# Patient Record
Sex: MALE | Race: WHITE
[De-identification: names, ages, dates, MRNs, and addresses within clinical notes are randomized per-mention and may not be internally consistent; named-entity substitution may affect disease eponyms.]

---

## 2018-03-23 ENCOUNTER — HOSPITAL ENCOUNTER (OUTPATIENT)
Dept: HOSPITAL 80 - FED | Age: 68
Setting detail: OBSERVATION
LOS: 1 days | Discharge: HOME | End: 2018-03-24
Attending: INTERNAL MEDICINE | Admitting: INTERNAL MEDICINE
Payer: COMMERCIAL

## 2018-03-23 DIAGNOSIS — R55: ICD-10-CM

## 2018-03-23 DIAGNOSIS — K92.2: Primary | ICD-10-CM

## 2018-03-23 DIAGNOSIS — K25.9: ICD-10-CM

## 2018-03-23 DIAGNOSIS — E86.1: ICD-10-CM

## 2018-03-23 DIAGNOSIS — D62: ICD-10-CM

## 2018-03-23 DIAGNOSIS — I95.9: ICD-10-CM

## 2018-03-23 DIAGNOSIS — G20: ICD-10-CM

## 2018-03-23 DIAGNOSIS — G89.29: ICD-10-CM

## 2018-03-23 DIAGNOSIS — Z86.711: ICD-10-CM

## 2018-03-23 DIAGNOSIS — K92.1: ICD-10-CM

## 2018-03-23 DIAGNOSIS — I10: ICD-10-CM

## 2018-03-23 DIAGNOSIS — Z79.1: ICD-10-CM

## 2018-03-23 DIAGNOSIS — E78.5: ICD-10-CM

## 2018-03-23 LAB
INR PPP: 1.1 (ref 0.83–1.16)
PLATELET # BLD: 188 10^3/UL (ref 150–400)
PROTHROMBIN TIME: 14.4 SEC (ref 12–15)

## 2018-03-23 PROCEDURE — 96374 THER/PROPH/DIAG INJ IV PUSH: CPT

## 2018-03-23 PROCEDURE — G0378 HOSPITAL OBSERVATION PER HR: HCPCS

## 2018-03-23 PROCEDURE — 0DB68ZX EXCISION OF STOMACH, VIA NATURAL OR ARTIFICIAL OPENING ENDOSCOPIC, DIAGNOSTIC: ICD-10-PCS | Performed by: INTERNAL MEDICINE

## 2018-03-23 PROCEDURE — 99291 CRITICAL CARE FIRST HOUR: CPT

## 2018-03-23 PROCEDURE — 43239 EGD BIOPSY SINGLE/MULTIPLE: CPT

## 2018-03-23 PROCEDURE — 93005 ELECTROCARDIOGRAM TRACING: CPT

## 2018-03-23 PROCEDURE — 97166 OT EVAL MOD COMPLEX 45 MIN: CPT

## 2018-03-23 RX ADMIN — PANTOPRAZOLE SODIUM SCH MG: 40 INJECTION, POWDER, FOR SOLUTION INTRAVENOUS at 22:14

## 2018-03-23 NOTE — GHP
[f rep st]



                                                            HISTORY AND PHYSICAL





DATE OF ADMISSION:  03/23/2018



CHIEF COMPLAINT:  Syncope and melena.



HISTORY OF PRESENT ILLNESS:  This is a 68-year-old male with a history of Parkinson's who presents wi
th complaints of melena, which began the day prior to presentation, as well as a syncopal episode aft
er using the restroom 2 evenings prior to presentation.  Patient reports being in his normal state of
 health prior to 48 hours before presentation to the emergency department when he described using the
 restroom in the evening and after finishing using the restroom losing consciousness entirely.  The p
atient fell.  Denies hitting his head.  Has some bruising on the palm of his hand, but otherwise no i
njuries.  The patient came to, was not postictal and then did not have a recurrent episode.  Did noti
ce some overall weakness and lethargy in the next day.  He then experienced 3 episodes of dark black 
tarry stools and was feeling lightheaded, checked his blood pressure at the time and noted his systol
ics to be in the 70s.  Therefore, decided to present to the emergency department for evaluation.  In 
the evening, patient reports that his dizziness is improving status post receiving IV fluids. He alyson
es any palpitations.  Denies chest pain, denies shortness of breath.  Denies any melena stools today.
  Says his last dark stool was yesterday.  Denies diarrhea.  Denies abdominal pain.  Denies nausea or
 vomiting.  He did have 1 episode of burning reflux type pain in the week preceding his presentation.
  He reports that is not a normal symptom for him.  He experienced after taking many of his over-the-
counter supplements.  The patient denies any previous episodes of melena or GI bleeding.  Denies any 
dysuria.  Denies hematuria.  Denies rashes.  Reports that his son has a viral illness that he was exp
osed to in the last week.  He had subjective fevers and chills, which has since resolved.



PAST MEDICAL HISTORY:  

1.  Parkinson's, on medication.

2.  Hyperlipidemia.

3.  Hypertension.

4.  Chronic back pain with recent back surgery.

5.  History of pulmonary embolism bilateral in 2013, status post completion of his anticoagulant huong
tment.  

6.  History is negative for tobacco, very rare alcohol.  No illicit drugs or marijuana.



FAMILY HISTORY:  Negative for Parkinson's.



ADVANCED DIRECTIVES:  Patient is full COR, full tube.  His wife would be his medical decision maker.



REVIEW OF SYSTEMS:  A 10-point review of systems is negative with the exception of reported in the HP
I.



PHYSICAL EXAMINATION:  VITAL SIGNS: Systolic blood pressure 90/62, heart rate in the 80s, respiratory
 rate 12, saturating 95% on room air.  Afebrile. GENERAL: This is a tall, thin appearing male in no a
cute distress.  HEENT:  Notable for dry mucous membranes.  Eye exam is negative for any icterus. CARD
IOVASCULAR:  Patient is regular rate and rhythm. PULMONARY:  Good respiratory effort.  Clear to auscu
ltation bilaterally.  GASTROINTESTINAL:  Positive bowel sounds.  Abdomen is thin and soft.  He is ten
deandre to palpation in the lower quadrant, left greater than right.  No rebound or guarding.  No appreci
able epigastric discomfort.  MUSCULOSKELETAL:  Negative for any lower extremity edema. SKIN:  Exam is
 negative for any rashes. NEUROLOGIC:  He is alert and oriented x3.  There is a baseline tremor appre
ciated on examination. PSYCHIATRIC:  He is pleasant cooperative on interview and examination.



DATA:  Hemoglobin is 13.  BMP is normal.  Telemetry which I personally reviewed and interpreted, show
s sinus rhythm without acute changes.  INR is 1.1.



ASSESSMENT AND PLAN:  This is a 68-year-old male with Parkinson's, presenting with melena.

1.  Acute GI bleed.  Suspect origin based on the patient's history.  He does use p.r.n. NSAIDs for ch
ronic back pain and did have recent symptoms of gastritis, although transient.  Patient has not had r
ecurrent melena today.  We will admit the patient to continue fluid resuscitation.  He has been typed
 and screened in the emergency department.  Will not recheck counts unless he has increased bleeding 
overnight.  Otherwise, will __________ order to the a.m.  He will be n.p.o. after midnight in anticip
ation of EGD.  We have initiated a pantoprazole drip and will monitor the patient's progress overnigh
t.  Gastroenterology has been consulted from the emergency department.

2.  Anemia secondary to acute blood loss.  I suspect will see even more drop in patient's hemoglobin 
after fluid resuscitation.  We will recheck in the morning.  He does not meet transfusion threshold a
t this time with a hemoglobin of 13.

3.  Parkinson's.  Will continue patient's carbidopa levodopa once reconciled.

4.  History of hypertension.  We will hold his outpatient medication.

5.  Hyperlipidemia.  We will hold his statin therapy.  Prophylaxis is contraindicated in the setting 
of acute gastrointestinal bleed.  

6.  Diet:  Clear liquids, n.p.o. after midnight.



DISPOSITION:  I expect in less than 2 midnights if the patient remains stable and the EGD is normal. 
 I have discussed the case with the emergency room physician.  Patient will be triaged to the medical
-surgical floor for care.





Job #:  [id number]

## 2018-03-23 NOTE — EDPHY
H & P


Stated Complaint: black tarry stool yesterday


Time Seen by Provider: 03/23/18 14:39


HPI/ROS: 





CHIEF COMPLAINT:  Black stool and lightheadedness





HISTORY OF PRESENT ILLNESS:  Patient is a 68-year-old man with history of 

Parkinson's as well as chronic back and abdominal pain on NSAIDs who comes to 

the emergency department complaining of black tarry stool last night .  No 

fever.  He had a post micturition syncope 2 days ago.  He denies chest pain or 

shortness of breath.  He has had a mild increase in his abdominal pain over the 

last week.  He is not on an antacids.  His primary is Dr. Valdez and he is had 

a colonoscopy by Dr. Mariee.  He did have a PE several years ago but only took 

blood thinners for 6 months.








REVIEW OF SYSTEMS:


Constitutional:  denies: chills, fever, recent illness, recent injury


EENTM: denies: blurred vision, double vision, nose congestion


Respiratory: denies: cough, shortness of breath


Cardiac: denies: chest pain, irregular heart rate, lightheadedness, palpitations


Gastrointestinal/Abdominal:  See HPI


Genitourinary: denies: dysuria, frequency, hematuria, pain


Musculoskeletal:  See HPI


Skin: denies: lesions, rash, jaundice, bruising


Neurological: denies: headache, numbness, paresthesia, tingling, dizziness, 

weakness


Hematologic/Lymphatic: denies: blood clots, easy bleeding, easy bruising


Immunologic/allergic: denies: HIV/AIDS, transplant








EXAM:


GENERAL:  Well-appearing, well-nourished and in no acute distress.


HEAD:  Atraumatic, normocephalic.


EYES:  Pupils equal round and reactive to light, extraocular movements intact, 

sclera anicteric, conjunctiva are normal.


ENT:  TMs normal, nares patent, oropharynx clear without exudates.  Moist 

mucous membranes.


NECK:  Normal range of motion, supple without lymphadenopathy or JVD.


LUNGS:  Breath sounds clear to auscultation bilaterally and equal.  No wheezes 

rales or rhonchi.


HEART:  Regular rate and rhythm without murmurs, rubs or gallops.


ABDOMEN:  Soft, nontender, normoactive bowel sounds.  No guarding, no rebound.  

No masses appreciated.


BACK:  No CVA tenderness, no spinal tenderness, step-offs or deformities


EXTREMITIES:  Normal range of motion, no pitting or edema.  No clubbing or 

cyanosis.


NEUROLOGICAL:  Significant tremor with intention, Cranial nerves II through XII 

grossly intact.  Normal speech, normal gait.  5/5 strength, normal movement in 

all extremities, normal sensation


PSYCH:  Normal


SKIN:  Warm, dry, normal turgor, no visible rashes or lesions.








Source: Patient


Exam Limitations: No limitations





- Personal History


Tetanus Vaccine Date: >10 YRS





- Medical/Surgical History


Hx Asthma: No


Hx Chronic Respiratory Disease: No


Hx Diabetes: No


Hx Cardiac Disease: No


Hx Renal Disease: No


Hx Cirrhosis: No


Hx Alcoholism: No


Hx HIV/AIDS: No


Hx Splenectomy or Spleen Trauma: No


Other PMH: parkinsons.  chronic bk issues.  PE





- Family History


Significant Family History: No pertinent family hx





- Social History


Smoking Status: Never smoked


Alcohol Use: Sober


Drug Use: None


Constitutional: 


 Initial Vital Signs











Temperature (C)  37.0 C   03/23/18 14:01


 


Heart Rate  78   03/23/18 14:01


 


Respiratory Rate  16   03/23/18 14:01


 


Blood Pressure  98/70 L  03/23/18 14:01


 


O2 Sat (%)  98   03/23/18 14:01








 











O2 Delivery Mode               Room Air














Allergies/Adverse Reactions: 


 





phenol Allergy (Severe, Verified 03/23/18 17:38)


 Other-Enter Comments


Statins-Hmg-Coa Reductase Inhibitor Allergy (Severe, Verified 03/23/18 17:39)


 Other-Enter Comments


iodine [Iodine] Allergy (Intermediate, Verified 08/23/11 12:30)


 EAT TOO MUCH FISH, RASH (PIMPLES ON BACK)


CHOCOLATE Allergy (Intermediate, Uncoded 08/23/11 12:29)


 PIMPLES ON BACK 2 DAYS AFTER EATING








Home Medications: 














 Medication  Instructions  Recorded


 


Aspirin EC [Aspirin EC 81 mg (*)] 81 mg PO DAILY@1200 03/23/18


 


Baclofen [Baclofen 20 mg (*)] 20 mg PO 08,12,21 03/23/18


 


Carbidopa/Levodopa 25/100Mg 1 tab PO TID PRN 03/23/18





[Sinemet 25/100 MG (*)]  


 


Carbidopa/Levodopa [Rytary ER 1 - 2 each PO HS PRN 03/23/18





48.75 mg-195 mg Cap]  


 


Carbidopa/Levodopa [Rytary ER 2 each PO DAILY@1900 03/23/18





48.75 mg-195 mg Cap]  


 


Carbidopa/Levodopa [Rytary ER 3 each PO 0630,1100,1530 03/23/18





48.75 mg-195 mg Cap]  


 


Cholecalciferol Vit D3 [Vitamin D3 5,000 units PO DAILY 03/23/18





(*)]  


 


Dicyclomine [Bentyl 10 MG (*)] 10 mg PO QID PRN 03/23/18


 


Gabapentin [Neurontin 300 MG (*)] 300 - 600 mg PO HS 03/23/18


 


Gabapentin [Neurontin 300 MG (*)] 300 mg PO 08,12 03/23/18


 


Herbals/Supplements -Info Only 1 ea PO DAILY 03/23/18


 


Ibuprofen [Motrin (*)] 200 - 600 mg PO HS PRN 03/23/18


 


Lidocaine [Lidocare] 1 each TP DAILY 03/23/18


 


Linaclotide [Linzess] 290 mcg PO DAILY 03/23/18


 


Meloxicam 15 mg PO DAILY 03/23/18


 


Niacin [Niacin 500 mg (*)] 1,000 mg PO DAILY@1200 03/23/18


 


Niacin [Niacin 500 mg (*)] 500 mg PO 08,18 03/23/18


 


Omega-3 Fatty Acids [Fish Oil 1000 1,000 mg PO 08,18 03/23/18





mg (*)]  


 


Omega-3 Fatty Acids [Fish Oil 1000 2,000 mg PO DAILY@1200 03/23/18





mg (*)]  


 


Polyethylene Glycol 3350 [Miralax 8.5 - 34 gm PO DAILY PRN 03/23/18





17 gm (*)]  


 


Propylene Glycol/Peg 400 [Systane 1 - 2 drops OP Q2 PRN 03/23/18





Ultra 0.4-0.3% Eye Drp]  


 


Rotigotine [Neupro] 1 patch TP DAILY@2200 03/23/18


 


clonazePAM [Klonopin (*)] 0.25 mg PO 08,21 03/23/18














Medical Decision Making





- Diagnostics


EKG Interpretation: 





An EKG obtained and was read and documented in trace view.  Please see trace 

view for full reading and report.  Sinus rhythm, no acute ischemic changes 


ED Course/Re-evaluation: 





3:20 p.m. we discussed the lab results.  Patient is significantly anemic.  He 

is no longer lightheaded after IV fluids.  He has been typed and crossed and 

been started on Protonix.





3:30 a.m. I discussed the case with Dr. George who will admit the medical 

service.  I will also page is gastrologist.





4:30 p.m. I discussed the case with Dr. Robbin Crystal who will text the 

gastrologist on-call here to notify to consult.


Differential Diagnosis: 





Partial list of the Differential diagnosis considered include but were not 

limited to;  peptic ulcer disease, upper GI bleed and although unlikely based 

on the history and physical exam, I also considered lower GI bleed, hemorrhoid, 

perforation, ischemia.  


Critical Care Time: 





Critical care time spent by me, Dr. Mirza exclusive with this patient was 35 

minutes, exclusive of the PA time exclusive of procedures.  The organ system 

that was at risk was gastrointestinal and I gave IV fluids, type and cross, 

consultation and admission to prevent worsening of the patient's condition





- Data Points


Laboratory Results: 


 Laboratory Results





 03/23/18 14:53 





 03/23/18 14:50 





 











  03/23/18 03/23/18 03/23/18





  15:00 14:53 14:53


 


WBC      4.55 10^3/uL 10^3/uL





     (3.80-9.50) 


 


RBC      3.79 10^6/uL L 10^6/uL





     (4.40-6.38) 


 


Hgb      13.0 g/dL L g/dL





     (13.7-17.5) 


 


Hct      36.5 % L %





     (40.0-51.0) 


 


MCV      96.3 fL fL





     (81.5-99.8) 


 


MCH      34.3 pg H pg





     (27.9-34.1) 


 


MCHC      35.6 g/dL g/dL





     (32.4-36.7) 


 


RDW      11.9 % %





     (11.5-15.2) 


 


Plt Count      188 10^3/uL 10^3/uL





     (150-400) 


 


MPV      9.2 fL fL





     (8.7-11.7) 


 


Neut % (Auto)      55.2 % %





     (39.3-74.2) 


 


Lymph % (Auto)      29.2 % %





     (15.0-45.0) 


 


Mono % (Auto)      9.9 % %





     (4.5-13.0) 


 


Eos % (Auto)      4.6 % %





     (0.6-7.6) 


 


Baso % (Auto)      0.7 % %





     (0.3-1.7) 


 


Nucleat RBC Rel Count      0.0 % %





     (0.0-0.2) 


 


Absolute Neuts (auto)      2.51 10^3/uL 10^3/uL





     (1.70-6.50) 


 


Absolute Lymphs (auto)      1.33 10^3/uL 10^3/uL





     (1.00-3.00) 


 


Absolute Monos (auto)      0.45 10^3/uL 10^3/uL





     (0.30-0.80) 


 


Absolute Eos (auto)      0.21 10^3/uL 10^3/uL





     (0.03-0.40) 


 


Absolute Basos (auto)      0.03 10^3/uL 10^3/uL





     (0.02-0.10) 


 


Absolute Nucleated RBC      0.00 10^3/uL 10^3/uL





     (0-0.01) 


 


Immature Gran %      0.4 % %





     (0.0-1.1) 


 


Immature Gran #      0.02 10^3/uL 10^3/uL





     (0.00-0.10) 


 


PT    14.4 SEC SEC  





    (12.0-15.0)  


 


INR    1.10   





    (0.83-1.16)  


 


APTT    29.7 SEC SEC  





    (23.0-38.0)  


 


Sodium      





    


 


Potassium      





    


 


Chloride      





    


 


Carbon Dioxide      





    


 


Anion Gap      





    


 


BUN      





    


 


Creatinine      





    


 


Estimated GFR      





    


 


Glucose      





    


 


Calcium      





    


 


Total Bilirubin      





    


 


Conjugated Bilirubin      





    


 


Unconjugated Bilirubin      





    


 


AST      





    


 


ALT      





    


 


Alkaline Phosphatase      





    


 


Total Protein      





    


 


Albumin      





    


 


Lipase      





    


 


Stool Occult Bld Scrn      





    


 


Patient ABO/Rh  O POSITIVE     





    


 


Antibody Screen  NEGATIVE     





    














  03/23/18 03/23/18





  14:50 14:50


 


WBC    





   


 


RBC    





   


 


Hgb    





   


 


Hct    





   


 


MCV    





   


 


MCH    





   


 


MCHC    





   


 


RDW    





   


 


Plt Count    





   


 


MPV    





   


 


Neut % (Auto)    





   


 


Lymph % (Auto)    





   


 


Mono % (Auto)    





   


 


Eos % (Auto)    





   


 


Baso % (Auto)    





   


 


Nucleat RBC Rel Count    





   


 


Absolute Neuts (auto)    





   


 


Absolute Lymphs (auto)    





   


 


Absolute Monos (auto)    





   


 


Absolute Eos (auto)    





   


 


Absolute Basos (auto)    





   


 


Absolute Nucleated RBC    





   


 


Immature Gran %    





   


 


Immature Gran #    





   


 


PT    





   


 


INR    





   


 


APTT    





   


 


Sodium    140 mEq/L mEq/L





    (135-145) 


 


Potassium    4.4 mEq/L mEq/L





    (3.5-5.2) 


 


Chloride    98 mEq/L mEq/L





    () 


 


Carbon Dioxide    29 mEq/l mEq/l





    (22-31) 


 


Anion Gap    13 mEq/L mEq/L





    (8-16) 


 


BUN    24 mg/dL H mg/dL





    (7-23) 


 


Creatinine    0.7 mg/dL mg/dL





    (0.7-1.3) 


 


Estimated GFR    > 60 





   


 


Glucose    86 mg/dL mg/dL





    () 


 


Calcium    9.2 mg/dL mg/dL





    (8.5-10.4) 


 


Total Bilirubin    0.8 mg/dL mg/dL





    (0.1-1.4) 


 


Conjugated Bilirubin    0.5 mg/dL mg/dL





    (0.0-0.5) 


 


Unconjugated Bilirubin    0.3 mg/dL mg/dL





    (0.0-1.1) 


 


AST    29 IU/L IU/L





    (17-59) 


 


ALT    23 IU/L IU/L





    (21-72) 


 


Alkaline Phosphatase    68 IU/L IU/L





    () 


 


Total Protein    6.6 g/dL g/dL





    (6.3-8.2) 


 


Albumin    3.8 g/dL g/dL





    (3.5-5.0) 


 


Lipase    211 IU/L IU/L





    () 


 


Stool Occult Bld Scrn  POSITIVE  H   





   (NEGATIVE)  


 


Patient ABO/Rh    





   


 


Antibody Screen    





   











Medications Given: 


 








Discontinued Medications





Sodium Chloride (Ns)  1,000 mls @ 0 mls/hr IV EDNOW ONE; Wide Open


   PRN Reason: Protocol


   Stop: 03/23/18 14:47


   Last Admin: 03/23/18 14:55 Dose:  1,000 mls


Ondansetron HCl (Zofran)  4 mg IVP EDNOW ONE


   Stop: 03/23/18 14:47


   Last Admin: 03/23/18 14:55 Dose:  4 mg








Departure





- Departure


Disposition: Home, Routine, Self-Care


Clinical Impression: 


 Upper GI bleed





Condition: Fair

## 2018-03-23 NOTE — GCON
[f rep st]



                                                                    CONSULTATION





DATE OF CONSULTATION:  03/23/2018



REFERRING PHYSICIAN:  Shari Bean MD



CHIEF COMPLAINT:  Syncopal episode followed by melena.



HISTORY OF PRESENT ILLNESS:  The patient is a 68-year-old gentleman followed by Dr. Valdez for Chase
son disease, hypertension, hyperlipidemia, and chronic back pain on chronic NSAID therapy who was adm
itted to the hospital today after a syncopal episode 2 evenings ago, followed by several black tarry 
stools.  He has had no bowel movement for the last 24 hours.  He denied hitting his head with the syn
copal episode.  He did come to the emergency room for overall weakness and lethargy and the passage o
f black tarry stools.  He was noted in the emergency room to have a hemoglobin of 13 and he was admit
valentino to observation for further GI workup.  Patient has no prior history of peptic ulcer disease.  He 
does have a history of an EGD and total colonoscopy performed by Dr. Emiliano Dahl in 2015.  He was not
ed to have H pylori gastritis on biopsy and treated with 2 weeks of triple antibiotic therapy.  He wa
s also noted to have several small polyps removed.



MEDICATIONS:  Prior to admission included enteric-coated aspirin 81 mg daily, baclofen 20 mg p.o. q.a
.m., noon and 9:00 p.m., carbidopa/levodopa 25/100 mg 1 tablet p.o. three times daily p.r.n., carbido
pa/levodopa ER 48.75 mg/195 mg cap 1-2 caps p.o. at bedtime p.r.n., levodopa/carbidopa 48.75 mg/195 m
g cap 2 p.o. q. 7 p.m., carbidopa/levodopa 48.75 mg/195 mg 3 tabs p.o. q. 6:30 a.m., 11 a.m., and 3:3
0 p.m., coli calciferol, vitamin D3 5000 units p.o. daily, Bentyl 10 mg p.o. four times daily p.r.n. 
abdominal cramping, Neurontin 300 mg 1-2 tabs p.o. at bedtime, Neurontin 300 mg p.o. q. 8 a.m. and no
on, ibuprofen 200-600 mg p.r.n. back pain at night, lidocaine patch transdermal daily, Linzess 290 mc
g p.o. daily, meloxicam 15 mg p.o. daily, niacin 1000 mg p.o. q. noon, and niacin 500 mg p.o. q. 8 a.
m. and 6 p.m.



ALLERGIES:  Phenol, statins, iodine, chocolate.



PAST MEDICAL HISTORY:  Significant for Parkinson disease, hyperlipidemia, hypertension, chronic back 
pain on chronic NSAIDs, history of pulmonary emboli bilaterally in 2013 with status post anticoagulat
ion therapy with completion of same.



FAMILY HISTORY:  Positive for Parkinson's.  Negative for GI malignancies or peptic ulcer disease.



SOCIAL HISTORY:  He resides in [place] with his wife.  He is retired.  He does not smoke tobacco or d
rink alcohol.



REVIEW OF SYSTEMS:  Other than melena and chronic back pain and movement disorder related to Parkinso
n's, negative for comprehensive review of systems.



PHYSICAL EXAMINATION:  VITAL SIGNS:  Temperature is 36.7 Celsius, pulse 93 regular, blood pressure 13
7/72, respiratory rate is 17, O2 saturation 97% on room air. 

GENERAL:  The patient is a well-developed and well-nourished male, lying in bed, no apparent distress
. 

INTEGUMENT:  Clear. 

HEENT:  Head atraumatic, normocephalic.  Pupils equal, round, reactive to light.  EOMs were intact.  
Sclerae nonicteric.  Nares patent.  Mucous membranes moist.  Dentition good. 

NECK:  Supple.  Trachea was midline. 

LYMPHATICS:  There is no cervical or axillary adenopathy palpated. 

PULMONARY:  Clear to percussion and auscultation. 

CARDIOVASCULAR:  Regular rhythm and rate.  Normal S1, S2 without murmur.  Peripheral pulses strong bi
laterally.  No pedal edema. 

GASTROINTESTINAL:  Abdomen is supple.  Positive bowel sounds.  No liver or spleen tip palpable.  No m
asses or tenderness noted.  No fluid wave noted. 

EXTREMITIES:  Without deformity. 

NEUROLOGIC:  Patient is alert and oriented x3.  There are no focal neurologic deficits.



LABS:  White count 4.55, hemoglobin 13.0, hematocrit 36.5, platelets 188,000.  Pro time 14.4, INR 1.1
0, PTT 29.7.  Electrolytes normal.  BUN 24, creatinine 0.7.  LFTs normal.  Urinalysis negative.



IMPRESSION:  

1.  A 68-year-old gentleman with Parkinson disease and chronic back pain on chronic nonsteroidal anti
-inflammatory drugs, now with recent episode of syncope followed by melena and mild anemia, rule out 
peptic ulcer disease.

2.  Hyperlipidemia.

3.  Hypertension.

4.  Chronic back pain.

5.  History of pulmonary emboli (remote), off anticoagulation.

6.  Chronic constipation likely secondary to Parkinson's.



RECOMMENDATIONS:  

1.  Clear liquid diet tonight.

2.  Protonix 40 mg IV q.6.

3.  N.p.o. after midnight.

4.  Esophagogastroduodenoscopy in the a.m.





Job #:  [id number]

## 2018-03-23 NOTE — CPEKG
Heart Rate: 78

RR Interval: 769

P-R Interval: 148

QRSD Interval: 90

QT Interval: 356

QTC Interval: 406

P Axis: 72

QRS Axis: 79

T Wave Axis: 66

EKG Severity - NORMAL ECG -

EKG Impression: SINUS RHYTHM

Electronically Signed By: Alex Mirza 23-Mar-2018 15:05:00

## 2018-03-24 VITALS — RESPIRATION RATE: 16 BRPM | TEMPERATURE: 97.3 F

## 2018-03-24 VITALS — SYSTOLIC BLOOD PRESSURE: 115 MMHG | DIASTOLIC BLOOD PRESSURE: 63 MMHG

## 2018-03-24 VITALS — OXYGEN SATURATION: 98 %

## 2018-03-24 VITALS — HEART RATE: 69 BPM

## 2018-03-24 LAB — PLATELET # BLD: 160 10^3/UL (ref 150–400)

## 2018-03-24 RX ADMIN — GABAPENTIN SCH MG: 300 CAPSULE ORAL at 09:50

## 2018-03-24 RX ADMIN — BACLOFEN SCH MG: 20 TABLET ORAL at 09:50

## 2018-03-24 RX ADMIN — GABAPENTIN SCH MG: 300 CAPSULE ORAL at 15:11

## 2018-03-24 RX ADMIN — PANTOPRAZOLE SODIUM SCH MG: 40 INJECTION, POWDER, FOR SOLUTION INTRAVENOUS at 03:08

## 2018-03-24 RX ADMIN — BACLOFEN SCH MG: 20 TABLET ORAL at 15:11

## 2018-03-24 RX ADMIN — PANTOPRAZOLE SODIUM SCH MG: 40 INJECTION, POWDER, FOR SOLUTION INTRAVENOUS at 08:00

## 2018-03-24 NOTE — ASDISCHSUM
----------------------------------------------

Discharge Information

----------------------------------------------

Plan Status:Home with No Needs                       Medically Cleared to Leave:03/24/2018

Discharge Date:03/24/2018                            CM D/C Disposition:Home, Routine, Self-Care

ADT D/C Disposition:Home, Routine, Self-Care         Projected Discharge Date:03/24/2018

Transportation at D/C:Family                         Discharge Delay Reason:

Follow-Up Date:03/24/2018                            Discharge Slot:

Final Diagnosis:

----------------------------------------------

Placement Information

----------------------------------------------

----------------------------------------------

Patient Contact Information

----------------------------------------------

Contact Name:ANTONINO                              Relationship:Wife

Address:[address]                             Home [phone]

                                                     Work [phone]

City:[place]                                         Alternate Phone:

[facility]/Zip Code:[place] [address]                              Email:

----------------------------------------------

Financial Information

----------------------------------------------

Financial Class:HMO and PPO Plans

Primary Plan Desc:[facility] PPO POS           Primary Plan Number:[id number]

Secondary Plan Desc:                                 Secondary Plan Number:

 

 

----------------------------------------------

Assessment Information

----------------------------------------------

----------------------------------------------

LACE

----------------------------------------------

LACE

 

Length of stay for            Answers:  Less than 1 day                       

current admission                                                             

Acuity / Level of             Answers:  No                                    

Care: Did the patient                                                         

have an inpatient                                                             

admission?                                                                    

Comorbidities - select        Answers:  Other                         Notes:  Parkinsons, hyperlipide
bibiana

all that apply                                                                , HTN, chronic back 

                                                                              pain, h/o PE

# of Emergency department     Answers:  0                                     

visits in the last 6                                                          

months                                                                        

Score: 1

 

Date Signed:  03/24/2018 04:19 PM

Electronically Signed By:Angie Manrique RN

 

 

----------------------------------------------

Case Management Discharge Plan Note

----------------------------------------------

Case Management Discharge

 

Discharge Order Complete?     Answers:  Yes                                   

Patient to Obtain             Answers:  Independently                         

Medications                                                                   

Transportation Arranged       Answers:  Family/Friends                        

Family Notified               Answers:  Yes                           Notes:  in room

Discharge Comments            

Notes:

3/24/2018 Case Management Note



Met pt and the following family members:

Wife Carlotta [phone]

Daughter Cristel [phone] who attends [employer]

Son Gianfranco [phone]



Referred pt to Financial Counseling for questions regarding billing.  Provided business card.



Pt did not have any d/c case management needs.



Case Management d/c poc:  home independent with family support.

 

 

Date Signed:  03/24/2018 04:22 PM

Electronically Signed By:Angie Manrique RN

 

 

----------------------------------------------

Intervention Information

----------------------------------------------

## 2018-03-24 NOTE — GDS
[f rep st]



                                                             DISCHARGE SUMMARY





DISCHARGE DIAGNOSES:  Include:

1.  Acute upper gastrointestinal bleed secondary to antral ulceration.

2.  Anemia secondary to acute blood loss.

3.  Hypotension secondary to gastrointestinal bleed.

4.  Syncopal episode secondary to hypovolemia.

5.  Parkinson.



HISTORY OF PRESENT ILLNESS:  This is a 68-year-old male, with a history of Parkinson and hypertension
 treated with medication, who presents with an episode of melena and syncope.  For details of the beatriz daly's initial presentation, please see the history and physical dated 03/23/2018.



CONSULTATIVE SERVICES:  Include Gastroenterology.



PROCEDURES:  03/24/2018, patient had an EGD that showed antral ulceration.  Status post biopsy for H 
pylori.



HOSPITAL COURSE BY ISSUE:  

1.  Acute upper GI bleed.  Patient presented with melena, a drop in his H and H, and symptoms consist
ent with acute GI.  He was admitted, fluid resuscitated, started on IV PPI, and taken for EGD which c
onfirmed the presence of a large antral ulceration.  Patient will be discharged on p.o. b.i.d. proton
 pump inhibitor, with followup with GI for his gastric biopsies, as well as for potential re-scoping 
if his H pylori is negative.

2.  Anemia secondary to acute blood loss.  Patient did drop his hemoglobin and hematocrit.  However, 
hemoglobin after fluid resuscitation the morning after admission is 10, not requiring transfusion.  I
 expect, with the treatment of the above ulceration, patient will have normalization of his H and H i
n the next several weeks.

3.  Syncope secondary to hypovolemia and GI bleed.  Patient did present with hypotension deemed secon
david to his acute bleeding episode and concurrent compliant use of antihypertensives.  His blood pres
sures have rebounded back to normal.  He can resume his home medication without complication.

4.  Parkinson.  Patient will be continued on his home medications without alteration.



MEDICATIONS AT THE TIME OF TRANSFER:  Please reference the med rec printed on the 03/24/2018.



APPOINTMENTS:  

1.  Include with [facility] for review of biopsy results and potential re-scoping if necessary
.

2.  With the primary care provider for ongoing management of his medical comorbidities.



PENDING STUDIES:  At the time of this dictation include gastric biopsies which will be followed by [facility].  



I spent greater than 30 minutes in the planning and coordination of this discharge.





Job #:  [id number]

## 2018-03-24 NOTE — PDANEPAE
ANE History of Present Illness


EGD for Upper GI Bleed





ANE Past Medical History





- Cardiovascular History


Hx Hypertension: Yes


Hx Arrhythmias: No





- Pulmonary History


Hx Oxygen in Use at Home: No


Hx Sleep Apnea: Yes





- Endocrine History


Hx Diabetes: No





- Chronic Pain History


Chronic Pain: Yes





ANE Review of Systems


Review of systems is: negative


Review of Systems: 








- Exercise capacity


METS (RN): 4 METS





ANE Patient History





- Allergies


Allergies/Adverse Reactions: 








phenol Allergy (Severe, Verified 03/23/18 17:38)


 Other-Enter Comments


Statins-Hmg-Coa Reductase Inhibitor Allergy (Severe, Verified 03/23/18 17:39)


 Other-Enter Comments


iodine [Iodine] Allergy (Intermediate, Verified 08/23/11 12:30)


 EAT TOO MUCH FISH, RASH (PIMPLES ON BACK)


CHOCOLATE Allergy (Intermediate, Uncoded 08/23/11 12:29)


 PIMPLES ON BACK 2 DAYS AFTER EATING








- Home Medications


Home Medications: 








Aspirin EC [Aspirin EC 81 mg (*)] 81 mg PO DAILY@1200 03/23/18 [Last Taken 03/23 /18]


Baclofen [Baclofen 20 mg (*)] 20 mg PO 08,12,21 03/23/18 [Last Taken 03/23/18 12

:00]


Carbidopa/Levodopa 25/100Mg [Sinemet 25/100 MG (*)] 1 tab PO TID PRN 03/23/18 [

Last Taken Unknown]


Carbidopa/Levodopa [Rytary ER 48.75 mg-195 mg Cap] 1 - 2 each PO HS PRN 03/23/ 18 [Last Taken Unknown]


Carbidopa/Levodopa [Rytary ER 48.75 mg-195 mg Cap] 2 each PO DAILY@1900 03/23/ 18 [Last Taken 03/22/18]


Carbidopa/Levodopa [Rytary ER 48.75 mg-195 mg Cap] 3 each PO 0630,1100,1530 03/ 23/18 [Last Taken 03/23/18 11:30]


Cholecalciferol Vit D3 [Vitamin D3 (*)] 5,000 units PO DAILY 03/23/18 [Last 

Taken 03/23/18]


Dicyclomine [Bentyl 10 MG (*)] 10 mg PO QID PRN 03/23/18 [Last Taken Unknown]


Gabapentin [Neurontin 300 MG (*)] 300 - 600 mg PO HS 03/23/18 [Last Taken 03/22/ 18]


Gabapentin [Neurontin 300 MG (*)] 300 mg PO 08,12 03/23/18 [Last Taken 03/23/18 

12:00]


Herbals/Supplements -Info Only 1 ea PO DAILY 03/23/18 [Last Taken Unknown]


Ibuprofen [Motrin (*)] 200 - 600 mg PO HS PRN 03/23/18 [Last Taken 03/21/18]


Lidocaine [Lidocare] 1 each TP DAILY 03/23/18 [Last Taken 03/20/18]


Linaclotide [Linzess] 290 mcg PO DAILY 03/23/18 [Last Taken 03/22/18]


Meloxicam 15 mg PO DAILY 03/23/18 [Last Taken 03/23/18]


Niacin [Niacin 500 mg (*)] 1,000 mg PO DAILY@1200 03/23/18 [Last Taken Unknown]


Niacin [Niacin 500 mg (*)] 500 mg PO 08,18 03/23/18 [Last Taken Unknown]


Omega-3 Fatty Acids [Fish Oil 1000 mg (*)] 1,000 mg PO 08,18 03/23/18 [Last 

Taken Unknown]


Omega-3 Fatty Acids [Fish Oil 1000 mg (*)] 2,000 mg PO DAILY@1200 03/23/18 [

Last Taken Unknown]


Polyethylene Glycol 3350 [Miralax 17 gm (*)] 8.5 - 34 gm PO DAILY PRN 03/23/18 [

Last Taken 03/22/18]


Propylene Glycol/Peg 400 [Systane Ultra 0.4-0.3% Eye Drp] 1 - 2 drops OP Q2 PRN 

03/23/18 [Last Taken Unknown]


Rotigotine [Neupro] 1 patch TP DAILY@2200 03/23/18 [Last Taken 03/22/18]


clonazePAM [Klonopin (*)] 0.25 mg PO 08,21 03/23/18 [Last Taken 03/23/18 08:00]








- Smoking Hx


Smoking Status: Never smoked





- Alcohol Use


Alcohol Use: Sober





ANE Labs/Vital Signs





- Labs


Result Diagrams: 


 03/24/18 03:58





 03/23/18 14:50





- Vital Signs


Blood Pressure: 138/69


Heart Rate: 72


Respiratory Rate: 17


O2 Sat (%): 96


Height: 189.23 cm


Weight: 74.253 kg





ANE Physical Exam





- Airway


Neck exam: FROM


Mallampati Score: Class 2





- Pulmonary


Pulmonary: clear to auscultation





- Cardiovascular


Cardiovascular: regular rate and rhythym





- ASA Status


ASA Status: III, E





ANE Anesthesia Plan


Anesthesia Plan: GA with mask

## 2018-03-24 NOTE — POSTANESTH
Post Anesthetic Evaluation


Cardiovascular Status: Normal, Stable


Respiratory Status: Normal, Stable


Level of Consciousness/Mental Status: Can Participate in Eval, Mildly Sleepy, 

Arousable


Pain Control: Adequate, Prn Tx Ordered


Nausea/Vomiting Control: Adequate, Prn Tx Ordered


Complications Possibly Related to Anesthesia: None Noted

## 2018-03-24 NOTE — ASMTLACE
LACE

 

Length of stay for            Answers:  Less than 1 day                       

current admission                                                             

Acuity / Level of             Answers:  No                                    

Care: Did the patient                                                         

have an inpatient                                                             

admission?                                                                    

Comorbidities - select        Answers:  Other                         Notes:  Parkinsons, hyperlipide
bibiana

all that apply                                                                , HTN, chronic back 

                                                                              pain, h/o PE

# of Emergency department     Answers:  0                                     

visits in the last 6                                                          

months                                                                        

Score: 1

 

Date Signed:  03/24/2018 04:19 PM

Electronically Signed By:Angie Manrique RN

## 2018-03-24 NOTE — POSTOPPROG
Post Op Note


Date of Operation: 03/24/18


Surgeon: Erick Montoya


Assistant: none


Anesthesiologist: Uri Pelaez MD


Anesthesia: Other (Specify) (IV General)


Pre-op Diagnosis: Melena


Post-op Diagnosis: 1. Same.  2. Antral ulcer without active bleed.


Indication: Melena


Procedure: EGD with gastric biopsy.


Findings: 12mm antral ulcer without stigmata of active bleed.


Inf/Abcess present in the surg proc area at time of surgery?: No


Depth: Superfical  (Skin SQ)


EBL: Minimal


Total fluids administered: none.


Complications: 





None.


Specimen(s): 





Random gastric biopsies obtained to r/o H. pylori gastritis.

## 2018-03-25 NOTE — GIREPORT
[facility]

Surgical Services - Endoscopy Department

_____________________________________________________________________________________________________
_______

Patient Name: Ag Soto                          Procedure Date: 3/24/2018 12:02 PM

MRN: [id number]                                       Account Number: [id number]

Patient Type: Inpatient                              Attending  MD/ ER Physician: Erick Montoya MD

_____________________________________________________________________________________________________
_______

 

Procedure:                    Upper GI endoscopy

Indications:                  Melena, Acute post hemorrhagic anemia

Providers:                    Erick Montoya MD

Medicines:                    General Anesthesia

Complications:                No immediate complications.

Description of Procedure:     After obtaining informed consent, the endoscope was passed under direct
 

                              vision. Throughout the procedure, the patient's blood pressure, pulse, 
and 

                              oxygen saturations were monitored continuously. The Endoscope was intro
duced 

                              through the mouth, and advanced to the second part of duodenum. The [facility]
er GI 

                              endoscopy was accomplished without difficulty. The patient tolerated th
e 

                              procedure well.

Findings:                     The examined esophagus was normal.

                              One non-bleeding cratered gastric ulcer with no stigmata of bleeding wa
s 

                              found in the gastric antrum. The lesion was 12 mm in largest dimension.


                              The cardia, gastric fundus and gastric body were normal. Biopsies were 
taken 

                              with a cold forceps for histology.

                              The examined duodenum was normal.

Estimated Blood Loss:         Estimated blood loss: none.

Post Op Diagnosis:            - Normal esophagus.

                              - Non-bleeding gastric ulcer with no stigmata of bleeding.

                              - Normal cardia, gastric fundus and gastric body. Biopsied.

                              - Normal examined duodenum.

Recommendation:               - Return patient to [facility] for possible discharge same day.

                              - Advance diet as tolerated today.

                              - Continue present medications.

                              - Use Protonix (pantoprazole) 40 mg PO BID for 2 months.

                              - Await pathology results.

                              - Repeat upper endoscopy in 2 months to check healing.

Attending Participation:

     I personally performed the entire procedure.

 

Erick Montoya MD

_____________________

Erick Montoya MD

3/25/2018 1:57:24 PM

This report has been signed electronicallyErick Montoya MD

Number of Addenda: 0

 

Note Initiated On: 3/24/2018 12:02 PM

[email or web address]

## 2018-03-26 ENCOUNTER — HOSPITAL ENCOUNTER (OUTPATIENT)
Dept: HOSPITAL 80 - FED | Age: 68
Setting detail: OBSERVATION
LOS: 1 days | Discharge: HOME | End: 2018-03-27
Attending: FAMILY MEDICINE | Admitting: FAMILY MEDICINE
Payer: COMMERCIAL

## 2018-03-26 DIAGNOSIS — G20: ICD-10-CM

## 2018-03-26 DIAGNOSIS — R42: ICD-10-CM

## 2018-03-26 DIAGNOSIS — R51: Primary | ICD-10-CM

## 2018-03-26 LAB
INR PPP: 1.16 (ref 0.83–1.16)
PLATELET # BLD: 203 10^3/UL (ref 150–400)
PROTHROMBIN TIME: 15 SEC (ref 12–15)

## 2018-03-26 PROCEDURE — 74177 CT ABD & PELVIS W/CONTRAST: CPT

## 2018-03-26 PROCEDURE — 93005 ELECTROCARDIOGRAM TRACING: CPT

## 2018-03-26 PROCEDURE — G0378 HOSPITAL OBSERVATION PER HR: HCPCS

## 2018-03-26 PROCEDURE — 97165 OT EVAL LOW COMPLEX 30 MIN: CPT

## 2018-03-26 PROCEDURE — 70450 CT HEAD/BRAIN W/O DYE: CPT

## 2018-03-26 NOTE — EDPHY
H & P


Stated Complaint: HA, dizziness


Time Seen by Provider: 03/26/18 20:13


HPI/ROS: 





CHIEF COMPLAINT:  Headache, lightheadedness





HISTORY OF PRESENT ILLNESS:  Patient is a 68-year-old man with history of 

Parkinson's who I admitted to the hospital last week for GI bleed who returns 

now to the ER complaining that he feels lightheaded and has a headache.  The 

symptoms gradually progressed over the last 3 hr.  He was sitting on the couch 

watching TV when they began.  He denies trauma.  He denies chest pain or 

shortness of breath.  He denies abdominal pain.  While in the hospital he had 

an upper endoscopy that revealed a gastric ulcer.  He has been on proton pump 

inhibitor ever since and told his wife this morning that he was feeling better 

than he had in a long time.  No vomiting.  No fever.  He does not take blood 

thinners.  He did feel cold.  Also states that he has not had a bowel movement 

since before his upper endoscopy.








REVIEW OF SYSTEMS:


Constitutional:  See HPI


EENTM: denies: blurred vision, double vision, nose congestion


Respiratory: denies: cough, shortness of breath


Cardiac: denies: chest pain, irregular heart rate, lightheadedness, palpitations


Gastrointestinal/Abdominal: denies: abdominal pain, diarrhea, nausea, vomiting, 

blood streaked stools


Genitourinary: denies: dysuria, frequency, hematuria, pain


Musculoskeletal: denies: joint pain, muscle pain


Skin: denies: lesions, rash, jaundice, bruising


Neurological:  See HPI denies: numbness, paresthesia, tingling, dizziness, 

weakness


Hematologic/Lymphatic: denies: blood clots, easy bleeding, easy bruising


Immunologic/allergic: denies: HIV/AIDS, transplant








EXAM:


GENERAL:  Moderate distress


HEAD:  Atraumatic, normocephalic.


EYES:  Keeps his eyes closed but can open them voluntarily, no nystagmus, 

Pupils equal round and reactive to light, extraocular movements intact, sclera 

anicteric, conjunctiva are normal.


ENT:  TMs normal, nares patent, oropharynx clear without exudates.  Moist 

mucous membranes.


NECK:  Normal range of motion, supple without lymphadenopathy or JVD.


LUNGS:  Breath sounds clear to auscultation bilaterally and equal.  No wheezes 

rales or rhonchi.


HEART:  Regular rate and rhythm without murmurs, rubs or gallops.


ABDOMEN:  Soft, nontender, normoactive bowel sounds.  No guarding, no rebound.  

No masses appreciated.


BACK:  No CVA tenderness, no spinal tenderness, step-offs or deformities


EXTREMITIES:  Normal range of motion, no pitting or edema.  No clubbing or 

cyanosis.


NEUROLOGICAL:  Cranial nerves II through XII grossly intact.  Normal speech, 

normal cerebellar exam.  5/5 strength, normal movement in all extremities, 

normal sensation


PSYCH:  Normal mood, normal affect.


SKIN:  Warm, dry, normal turgor, no visible rashes or lesions.








Source: Patient


Exam Limitations: No limitations





- Personal History


Current Tetanus/Diphtheria Vaccine: Unsure


Current Tetanus Diphtheria and Acellular Pertussis (TDAP): Unsure


Tetanus Vaccine Date: >10 YRS





- Medical/Surgical History


Hx Asthma: No


Hx Chronic Respiratory Disease: No


Hx Diabetes: No


Hx Cardiac Disease: No


Hx Renal Disease: No


Hx Cirrhosis: No


Hx Alcoholism: No


Hx HIV/AIDS: No


Hx Splenectomy or Spleen Trauma: No


Other PMH: parkinsons.  chronic bk issues.  PE.  Peptic ulcer disease





- Social History


Smoking Status: Never smoked


Alcohol Use: Sober


Drug Use: None


Constitutional: 


 Initial Vital Signs











Temperature (C)  36.4 C   03/26/18 20:04


 


Heart Rate  91   03/26/18 20:04


 


Respiratory Rate  17   03/26/18 20:04


 


Blood Pressure  166/90 H  03/26/18 20:04


 


O2 Sat (%)  99   03/26/18 20:04








 











O2 Delivery Mode               Room Air














Allergies/Adverse Reactions: 


 





phenol Allergy (Severe, Verified 03/23/18 17:38)


 Other-Enter Comments


Statins-Hmg-Coa Reductase Inhibitor Allergy (Severe, Verified 03/23/18 17:39)


 Other-Enter Comments


iodine [Iodine] Allergy (Intermediate, Verified 08/23/11 12:30)


 EAT TOO MUCH FISH, RASH (PIMPLES ON BACK)


CHOCOLATE Allergy (Intermediate, Uncoded 08/23/11 12:29)


 PIMPLES ON BACK 2 DAYS AFTER EATING








Home Medications: 














 Medication  Instructions  Recorded


 


Aspirin EC [Aspirin EC 81 mg (*)] 81 mg PO DAILY@1200 03/23/18


 


Baclofen [Baclofen 20 mg (*)] 20 mg PO 08,12,21 03/23/18


 


Carbidopa/Levodopa 25/100Mg 1 tab PO TID PRN 03/23/18





[Sinemet 25/100 MG (*)]  


 


Carbidopa/Levodopa [Rytary ER 1 - 2 each PO HS PRN 03/23/18





48.75 mg-195 mg Cap]  


 


Carbidopa/Levodopa [Rytary ER 2 each PO DAILY@20 03/23/18





48.75 mg-195 mg Cap]  


 


Carbidopa/Levodopa [Rytary ER 3 each PO 0630,1100,1530 03/23/18





48.75 mg-195 mg Cap]  


 


Cholecalciferol Vit D3 [Vitamin D3 5,000 units PO DAILY 03/23/18





(*)]  


 


Dicyclomine [Bentyl 10 MG (*)] 10 mg PO QID PRN 03/23/18


 


Gabapentin [Neurontin 300 MG (*)] 300 - 600 mg PO HS 03/23/18


 


Gabapentin [Neurontin 300 MG (*)] 300 mg PO 08,12 03/23/18


 


Herbals/Supplements -Info Only 1 ea PO DAILY 03/23/18


 


Ibuprofen [Motrin (*)] 200 - 600 mg PO HS PRN 03/23/18


 


Lidocaine [Lidocare] 1 each TP DAILY 03/23/18


 


Linaclotide [Linzess] 290 mcg PO DAILY 03/23/18


 


Meloxicam 15 mg PO DAILY 03/23/18


 


Niacin [Niacin 500 mg (*)] 1,000 mg PO DAILY@1200 03/23/18


 


Niacin [Niacin 500 mg (*)] 500 mg PO 08,18 03/23/18


 


Omega-3 Fatty Acids [Fish Oil 1000 1,000 mg PO 08,18 03/23/18





mg (*)]  


 


Omega-3 Fatty Acids [Fish Oil 1000 2,000 mg PO DAILY@1200 03/23/18





mg (*)]  


 


Polyethylene Glycol 3350 [Miralax 8.5 - 34 gm PO DAILY PRN 03/23/18





17 gm (*)]  


 


Propylene Glycol/Peg 400 [Systane 1 - 2 drops OP Q2 PRN 03/23/18





Ultra 0.4-0.3% Eye Drp]  


 


Rotigotine [Neupro] 1 patch TP DAILY@2200 03/23/18


 


clonazePAM [Klonopin (*)] 0.25 mg PO 08,21 03/23/18


 


Pantoprazole Sodium [Protonix 40mg 40 mg PO BID #60 tab 03/24/18





(*)]  














Medical Decision Making





- Diagnostics


EKG Interpretation: 





An EKG obtained and was read and documented in trace view.  Please see trace 

view for full reading and report.  Sinus rhythm, no acute ischemic changes 


Imaging: Discussed imaging studies w/ On call Radiologist


ED Course/Re-evaluation: 





9:30 p.m. the patient is feeling much better.  We discussed his lab work and 

imaging which is reassuring as is his EKG.  He states that he thinks he would 

prefer to go home although I did offer admission for observation.  I will give 

him another L of fluid while we watched him and he would like to provide a 

urine sample.  He states that he took his Parkinson's medication is now feeling 

much better.





10:30 p.m. the patient states that his headache returned to some degree but is 

now gone again except that he has some throbbing in his head.  He states that 

it seems to be the part of his head that is up as opposed to down on the bed.  

We will continue to hydrate.  He declines headache medicine.





10:42 p.m. the patient states that he would like to be admitted for further 

observation.  I agree that this is reasonable.





10:50 p.m. I discussed the case with Dr. Nelson who will admit to the medical 

service.


Differential Diagnosis: 





Partial list of the Differential diagnosis considered include but were not 

limited to;  Parkinson's, dehydration, and although unlikely based on the 

history and physical exam, I also considered migraine, tension headache, 

hemorrhage, anemia, hypotension, C diff, urinary tract infection.  I discussed 

these differential diagnoses and the plan with the patient as well as the usual 

and expected course.  The patient understands that the diagnosis is provisional 

and that in medicine we are not always correct and that further workup is often 

warranted.  Usual and customary warnings were given.  All of the patient's 

questions were answered.  The patient was instructed to return to the emergency 

department should the symptoms at all worsen or return, otherwise to followup 

with the physician as we discussed.





- Data Points


Laboratory Results: 


 Laboratory Results





 03/26/18 20:35 





 03/26/18 20:35 








Medications Given: 


 





Aspirin Buffered (Aspirin Ec)  81 mg PO DAILY@1200 [facility]


   Stop: 09/23/18 11:59


   Last Admin: 03/27/18 14:09 Dose:  81 mg


Baclofen (Baclofen)  20 mg PO 08,12,21 [facility]


   Stop: 09/23/18 11:59


   Last Admin: 03/27/18 11:38 Dose:  20 mg


Cholecalciferol (Vitamin D)  5,000 units PO DAILY [facility]


   Stop: 09/23/18 08:59


   Last Admin: 03/27/18 09:48 Dose:  5,000 units


Clonazepam (Klonopin)  0.25 mg PO 08,21 [facility]


   Stop: 09/23/18 08:29


   Last Admin: 03/27/18 10:04 Dose:  0.25 mg


Gabapentin (Neurontin)  300 mg PO 08,12 [facility]


   Stop: 09/23/18 08:29


   Last Admin: 03/27/18 14:09 Dose:  300 mg


Miscellaneous Medication (Carbidopa/Levodopa [Rytary Er 48.75 Mg-195 Mg Cap])  

3 each PO 0630,1100,1530 [facility]


   Stop: 09/23/18 06:29


   Last Admin: 03/27/18 11:37 Dose:  3 cap


Miscellaneous Medication (Linaclotide [Linzess])  290 mcg PO DAILY [facility]


   Stop: 09/23/18 08:59


   Last Admin: 03/27/18 09:47 Dose:  Not Given


Miscellaneous Medication (Meloxicam [Meloxicam])  15 mg PO DAILY [facility]


   Stop: 09/23/18 08:59


   Last Admin: 03/27/18 09:51 Dose:  Not Given


Miscellaneous Medication (Icy Hot Lidocaine/Menthol 4%/1% Patch)  1 patch TD 

DAILY [facility]


   Stop: 09/23/18 08:44


   Last Admin: 03/27/18 09:46 Dose:  Not Given


Niacin (Niacin)  500 mg PO 08,18 [facility]


   Stop: 09/23/18 08:29


   Last Admin: 03/27/18 11:39 Dose:  Not Given


Niacin (Niacin)  1,000 mg PO DAILY@1200 [facility]


   Stop: 09/23/18 11:59


   Last Admin: 03/27/18 11:39 Dose:  1,000 mg


Omega-3-Acid Ethyl Esters (Fish Oil)  1,000 mg PO 08,18 [facility]UR


   Stop: 09/23/18 08:29


   Last Admin: 03/27/18 10:05 Dose:  1,000 mg


Omega-3-Acid Ethyl Esters (Fish Oil)  2,000 mg PO DAILY@1200 [facility]


   Stop: 09/23/18 11:59


   Last Admin: 03/27/18 14:09 Dose:  2,000 mg


Pantoprazole Sodium (Protonix)  40 mg PO BID [facility]


   Stop: 09/23/18 08:59


   Last Admin: 03/27/18 09:44 Dose:  40 mg


Pantoprazole Sodium (Protonix)  40 mg PO BID [facility]


   Stop: 09/23/18 08:59


   Last Admin: 03/27/18 09:53 Dose:  Not Given


Senna/Docusate Sodium (Senokot-S)  1 - 2 tab PO BID [facility]


   PRN Reason: Protocol


   Stop: 09/23/18 08:59


   Last Admin: 03/27/18 14:09 Dose:  2 tab





Discontinued Medications





Acetaminophen (Tylenol)  1,000 mg PO ONCE ONE


   Stop: 03/27/18 08:29


   Last Admin: 03/27/18 09:44 Dose:  1,000 mg


Diphenhydramine HCl (Benadryl Injection)  50 mg IVP ONCE ONE


   Stop: 03/27/18 08:28


   Last Admin: 03/27/18 09:44 Dose:  50 mg


Sodium Chloride (Ns)  1,000 mls @ 0 mls/hr IV EDNOW ONE; Wide Open


   PRN Reason: Protocol


   Stop: 03/26/18 20:28


   Last Admin: 03/26/18 20:59 Dose:  1,000 mls


Sodium Chloride (Ns)  1,000 mls @ 0 mls/hr IV ONCE ONE


   PRN Reason: Wide Open


   Stop: 03/26/18 21:48


   Last Admin: 03/26/18 21:48 Dose:  1,000 mls


Ondansetron HCl (Zofran)  4 mg IVP EDNOW ONE


   Stop: 03/26/18 20:28


   Last Admin: 03/26/18 21:49 Dose:  Not Given








Departure





- Departure


Disposition: [facility]


Clinical Impression: 


 Lightheaded





Headache


Qualifiers:


 Headache type: unspecified Headache chronicity pattern: acute headache 

Intractability: not intractable Qualified Code(s): R51 - Headache





Condition: Fair

## 2018-03-26 NOTE — CPEKG
Heart Rate: 69

RR Interval: 870

P-R Interval: 172

QRSD Interval: 90

QT Interval: 388

QTC Interval: 416

P Axis: 80

QRS Axis: 68

T Wave Axis: 59

EKG Severity - NORMAL ECG -

EKG Impression: SINUS RHYTHM

Electronically Signed By: Alex Mirza 26-Mar-2018 20:42:26

## 2018-03-27 VITALS
SYSTOLIC BLOOD PRESSURE: 108 MMHG | RESPIRATION RATE: 18 BRPM | OXYGEN SATURATION: 94 % | HEART RATE: 84 BPM | TEMPERATURE: 97.2 F | DIASTOLIC BLOOD PRESSURE: 57 MMHG

## 2018-03-27 LAB — PLATELET # BLD: 187 10^3/UL (ref 150–400)

## 2018-03-27 RX ADMIN — GABAPENTIN SCH MG: 300 CAPSULE ORAL at 14:09

## 2018-03-27 RX ADMIN — GABAPENTIN SCH MG: 300 CAPSULE ORAL at 10:04

## 2018-03-27 RX ADMIN — MENTHOL SCH: 1 SPRAY TOPICAL at 09:45

## 2018-03-27 RX ADMIN — MENTHOL SCH: 1 SPRAY TOPICAL at 09:46

## 2018-03-27 NOTE — ASMTCMCOM
CM Note

 

CM Note                       

Notes:

Chart reviewed for discharge planning purposes. Patient just discharged independent on 

3/24/18. Presented to ED with complaints of headache and lightheadedness, admitted to [facility]BS. CM to 

follow.

 

Date Signed:  03/27/2018 09:35 AM

Electronically Signed By:Jerri Christine RN

## 2018-03-27 NOTE — ASMTLACE
LACE

 

Length of stay for            Answers:  1 day                                 

current admission                                                             

Acuity / Level of             Answers:  No                                    

Care: Did the patient                                                         

have an inpatient                                                             

admission?                                                                    

Comorbidities - select        Answers:  Other                         Notes:  parkinsons, GI bleed

all that apply                                                                

# of Emergency department     Answers:  1-2                                   

visits in the last 6                                                          

months                                                                        

Score: 3

 

Date Signed:  03/27/2018 04:17 PM

Electronically Signed By:Jerri Christine RN

## 2018-03-27 NOTE — ASMTCMCOM
CM Note

 

CM Note                       

Notes:

Patient has been medically cleared for discharge to home. No needs identified at this time. CM 

available  should needs arise.

 

Date Signed:  03/27/2018 04:19 PM

Electronically Signed By:Jerri Christine RN

## 2018-03-27 NOTE — PDDCSUM
Discharge Summary


Discharge Summary: 


DISCHARGE SUMMARY





FOLLOW-UP ITEMS: 


Reassess headaches and Parkinson's medications with Dr. Barone


Reassess abdominal pain and the potential role of constipation with Dr. Matheus Mariee





DATE OF ADMISSION:  3/26/2018


DATE OF DISCHARGE:  3/27/2018





DISCHARGE DIAGNOSES:


1. Acute on chronic headache


2. Acute on chronic abdominal pain


3. Acute constipation


4. Chronic Parkinson's disease





CONSULTATIONS:


None





PROCEDURES / IMAGING:


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 

hemidiaphragm without any evidence of acute volvulus obstruction or colitis; 

acute on chronic constipation with mild distention of the cecum and semi-solid 

stool within the distal ileum





CHIEF COMPLAINT:


Acute on chronic headache with acute on chronic abdominal pain





SUBJECTIVE:


Patient is feeling improved at discharge, his pain level of his headache has 

significantly lessened, he has moved his bowels, his abdominal pain is less





PHYSICAL EXAM ON DISCHARGE:


Systolic blood pressure is 120-160, heart rate 70 90, afebrile overnight, 

satting on room air, alert awake oriented x3, patient has ongoing dyskinetic 

movements of his head and upper extremities, he has some tenderness to 

palpation over the left hemidiaphragm without any guarding, bowel sounds are 

present, lungs are clear





LABS ON DISCHARGE:


Hemoglobin 11.3, white blood count 4300, platelets 059617, potassium 3.8, 

creatinine 0.6, BUN 16, liver panel unremarkable





HOSPITAL COURSE BY PROBLEM:


The patient presented with acute on chronic headache which was resulting in 

some lightheadedness and was characterized as the worst headache of his life.  

Head CT demonstrated no intracranial hemorrhage and his neurologic exam was 

benign other than his chronic dyskinetic movements in the setting of his 

Parkinson's disease.  The exact etiology of his headache is unclear.  He 

declined to receive any opiates and his symptoms improved with his home as 

needed medications including Tylenol, baclofen.  It is unclear whether he has 

experienced headaches symptoms secondary to the introduction of pantoprazole, 

but his wife does note that he has had increased symptoms temporarily 

associated with the initiation of that medication.  Given the headaches are 

consider a side effect of this med, I recommended adjusting it to another 

proton pump inhibitor to gauge whether he experiences any improvement.  The 

patient also describes worsening of his headaches with certain body positions, 

and I urged him to consider that there may be a musculoskeletal component to 

these headaches that may be alleviated non pharmacologically with heat, ice, 

massage therapy.  The patient keeps a very detailed log of his headache pain 

and I have urged him to continue this log, detailing it with any adjustments in 

medications or interventions.  I recommended that he follow up with his primary 

neurologist to focus on headache management, as it has been a chronic issue for 

the patient and I sense that the patient's tolerance of this symptom may be 

somewhat weaning as his overall patience with his medical condition may be 

weaning as well.  





On the patient's initial history and physical, it was noted that he had left 

lower quadrant tenderness and the patient reported abdominal pain.  Insidious 

pathology was ruled out with an abdominal CT which demonstrated malrotation of 

bowel with some bowel distention secondary to constipation.  Patient had 

otherwise no evidence of infection.  He had a large bowel movement and his pain 

was somewhat improved.  I believe that the patient's abdominal pain is 

currently consistent with his chronic abdominal pain level and there is nothing 

further to be investigated.  The patient is already on supportive care 

medication from Dr. Matheus Mariee including Linzess as well as laxatives, I spent 

a considerable amount of time coaching the patient is wife regarding potential 

strategies of increasing his frequency of bowel movements to prevent 

subclinical bowel distention which can manifest as acute worsening of chronic 

abdominal pain.  I suggested that the patient consider introducing a scheduled 

stool softener in addition to what he is already taking, or increase his daily 

consumption of his laxative of choice, namely MiraLax.  The patient indicated 

that he would consider the strategies, but he was not ready to make overall 

changes.  I also suggested to the patient that he keep detailed records of his 

abdominal pain and bowel movements on his documentation logs, and the patient 

will do so.  I recommended that he follow-up specifically with Dr. Matheus Mariee 

to focus on this issue, as this is an issue which consistently concerns the 

patient and warrants special attention.  The patient's other chronic medical 

issues remained stable, and I recommended that he follow up with his primary 

neurologist to continue the conversation regarding his Parkinson's medications 

and that he follow up with his primary orthopedist to discuss his arthritic 

back pain.





The patient and his wife asked me whether they should consider establishing 

primary care with an internal medicine physician as opposed to Family Medicine 

one, and I recommended that they open up this conversation with her primary 

care doctor to determine whether he has any recommendations regarding other 

members in his practice with internal medicine training.  For continuity, I 

recommended that the patient continue this conversation through his primary 

care provider office as opposed to simply re-establishing care with a new office

, particularly at this state of progression of his many chronic medical issues.





DISCHARGE MEDICATIONS:


Please see official discharge medication reconciliation sheet in chart , 

continue all home medications with the change from pantoprazole to omeprazole 

40 mg twice daily, Senokot S1 tab twice daily, and Tylenol as needed for pain.





DISCHARGE INSTRUCTIONS:


Please follow up with primary neurologist, then primary gastroenterologist, 

then primary care physician.

## 2018-03-27 NOTE — PDGENHP
History and Physical





- Chief Complaint


Dizziness and headache





- History of Present Illness








Source-patient provides history and appears reliable.  Case discussed with ED 

provider and EMR was reviewed.





HPI - this is a very pleasant 68-year-old gentleman with past medical history 

significant for Parkinson's disease who presents to the emergency department 

today with complaints of severe headache and some lightheadedness.  Patient 

denies any focal deficits.  He does have a baseline tremor related to his 

Parkinson's but reports that his symptoms are pretty close to baseline.  He 

notes that they are on slowly progressing to decompensation and with 

difficulties with movement but he absolutely denies any focal weakness or 

generalized weakness.  Patient does utilize a walker this needed he has not had 

any recent falls.  Patient denies any associated changes in vision blurry or 

double.  He denies any numbness or tingling anywhere except for his chronic 

neuropathy in his hands and feet.  Patient felt that his headache was 

increasingly more severe at home and was the "worse headache of my life"patient 

reports that were CIS pain was 8/10.  Currently patient states that his 

headache is decreased down to a 4/10.  Patient was recently admitted several 

days ago for an upper GI bleed related to a culture gastric ulcer.  His H&H had 

remained stable he denies any melena or hematochezia.  Patient also is 

concerned regarding some lightheadedness.  He denies any presyncope or syncopal 

episodes while home.  Patient's symptoms of headache and dizziness started 

suddenly home several hours prior to his arrival in the emergency department 

while sitting watching TV.  Sitting up made his head symptoms worsen.  Lying 

down improved his symptoms.  Patient denies any vertiginous type symptoms.  He 

has not had any on nausea or vomiting.





In the ED, patient underwent evaluation with CT head which was negative for any 

acute findings.  He was given IV fluids and reported resolution of his 

dizziness.  His headache as above has improved but not complete.  Patient has 

declined any treatment or medications for his headache at this time.





History Information





- Allergies/Home Medication List


Allergies/Adverse Reactions: 








phenol Allergy (Severe, Verified 03/23/18 17:38)


 Other-Enter Comments


Statins-Hmg-Coa Reductase Inhibitor Allergy (Severe, Verified 03/23/18 17:39)


 Other-Enter Comments


iodine [Iodine] Allergy (Intermediate, Verified 08/23/11 12:30)


 EAT TOO MUCH FISH, RASH (PIMPLES ON BACK)


CHOCOLATE Allergy (Intermediate, Uncoded 08/23/11 12:29)


 PIMPLES ON BACK 2 DAYS AFTER EATING





Home Medications: 








Aspirin EC [Aspirin EC 81 mg (*)] 81 mg PO DAILY@1200 03/23/18 [Last Taken 03/26 /18]


Baclofen [Baclofen 20 mg (*)] 20 mg PO 08,12,21 03/23/18 [Last Taken 03/26/18 12

:00]


Carbidopa/Levodopa 25/100Mg [Sinemet 25/100 MG (*)] 1 tab PO TID PRN 03/23/18 [

Last Taken Unknown]


Carbidopa/Levodopa [Rytary ER 48.75 mg-195 mg Cap] 1 - 2 each PO HS PRN 03/23/ 18 [Last Taken Unknown]


Carbidopa/Levodopa [Rytary ER 48.75 mg-195 mg Cap] 2 each PO DAILY@20 03/23/18 [

Last Taken 03/26/18]


Carbidopa/Levodopa [Rytary ER 48.75 mg-195 mg Cap] 3 each PO 0630,1100,1530 03/ 23/18 [Last Taken 03/27/18 06:30]


Cholecalciferol Vit D3 [Vitamin D3 (*)] 5,000 units PO DAILY 03/23/18 [Last 

Taken 03/26/18]


Dicyclomine [Bentyl 10 MG (*)] 10 mg PO QID PRN 03/23/18 [Last Taken Unknown]


Gabapentin [Neurontin 300 MG (*)] 300 - 600 mg PO HS 03/23/18 [Last Taken 03/25/ 18]


Gabapentin [Neurontin 300 MG (*)] 300 mg PO 08,12 03/23/18 [Last Taken 03/26/18 

12:00]


Herbals/Supplements -Info Only 1 ea PO DAILY 03/23/18 [Last Taken Unknown]


Ibuprofen [Motrin (*)] 200 - 600 mg PO HS PRN 03/23/18 [Last Taken 03/21/18]


Lidocaine [Lidocare] 1 each TP DAILY 03/23/18 [Last Taken 03/20/18]


Linaclotide [Linzess] 290 mcg PO DAILY 03/23/18 [Last Taken 03/26/18]


Meloxicam 15 mg PO DAILY 03/23/18 [Last Taken 03/26/18]


Niacin [Niacin 500 mg (*)] 1,000 mg PO DAILY@1200 03/23/18 [Last Taken 03/26/18]


Niacin [Niacin 500 mg (*)] 500 mg PO 08,18 03/23/18 [Last Taken 03/26/18 08:00]


Omega-3 Fatty Acids [Fish Oil 1000 mg (*)] 1,000 mg PO 08,18 03/23/18 [Last 

Taken 03/26/18 08:00]


Omega-3 Fatty Acids [Fish Oil 1000 mg (*)] 2,000 mg PO DAILY@1200 03/23/18 [

Last Taken 03/26/18]


Polyethylene Glycol 3350 [Miralax 17 gm (*)] 8.5 - 34 gm PO DAILY PRN 03/23/18 [

Last Taken 03/26/18 12:00 17 gm]


Propylene Glycol/Peg 400 [Systane Ultra 0.4-0.3% Eye Drp] 1 - 2 drops OP Q2 PRN 

03/23/18 [Last Taken Unknown]


Rotigotine [Neupro] 1 patch TP DAILY@2200 03/23/18 [Last Taken 03/22/18]


clonazePAM [Klonopin (*)] 0.25 mg PO 08,21 03/23/18 [Last Taken 03/26/18 08:00]





I have personally reviewed and updated: family history, medical history, social 

history, surgical history





- Past Medical History


Additional medical history: Parkinson's disease, upper GI bleed related to 

gastric ulcer 03/20/2018, chronic back pain, history of PE status post 

completion of anticoagulation.  HLD, HTN, PHILLIP, neuropathy in distal extremities.





- Surgical History


Additional surgical history: Tonsillectomy and adenoidectomy, left TKA, back 

surgery, EGD





- Family History


Additional family history: Negative for Parkinson's or other neurologic 

deficits.  Father with history CHF.  Mother with history CAD and stent placement





- Social History


Smoking Status: Never smoked


Alcohol Use: None


Drug Use: None


Additional social history: Patient is  and lives with his wife.  He is a 

/.  Cor status-full.





Review of Systems


Review of Systems: 





ROS: 10pt was reviewed & negative except for what was stated in HPI & below


Gastrointestinal: Reports: abdominal pain.  Denies: black stools, rectal 

bleeding, diarrhea, nausea





Physical Exam


Physical Exam: 








 Selected Entries











  03/26/18 03/27/18





  20:04 00:11


 


Blood Pressure  Automatic





Method  


 


Heart Rate 91 76


 


Respiratory 17 16





Rate  


 


O2 Sat (%) 99 98


 


Temperature (C) 36.4 C 


 


Blood Pressure 166/90 H 132/85 H


 


Mean Arterial 115 H 100





Pressure (MAP)  


 


O2 Delivery Room Air Room Air





Mode  


 


Temperature Oral 





Source  











Constitutional: no apparent distress, chronically ill appearing, other (NAD.  

Patient is lying quietly in bed still.  His abdomen does appear slightly 

distant line CT is no acute distress.)


Eyes: PERRL, anicteric sclera, EOMI, No scleral injection


Ears, Nose, Mouth, Throat: no oral mucosal ulcers, dry mucous membranes, No 

poor dentition


Cardiovascular: regular rate and rhythym, no murmur, rub, or gallop, pulses 

symmetric bilaterally, No edema


Peripheral Pulses: 2+: dorsalis-pedis (R), dorsalis-pedis (L)


Respiratory: no respiratory distress, no rales or rhonchi, clear to auscultation


Gastrointestinal: normoactive bowel sounds, soft, non-tender abdomen, no 

palpable masses, guarding (Initially some voluntary guarding and withdrawal 

from exam upon repeat evaluation patient does continue complaint significant 

tenderness but [place] for palpation.), distension, No rebound


Genitourinary: no bladder tenderness, No huynh in urethra


Skin: warm, normal color, no rashes or abrasions, other ([place])


Musculoskeletal: generalized weakness (Patient is able to move all his 

extremities.  His some slowed movement.  Cog wheeling at the elbow and wrist..)


Neurologic: facial droop (Patient with a left lower facial droop which she 

reports is chronic.)





Lab Data & Imaging Review





 03/27/18 04:27





 03/27/18 04:27





 Laboratory Tests











  03/26/18 03/26/18 03/26/18





  20:35 20:35 20:35


 


WBC    6.71


 


RBC    3.71 L


 


Hgb    12.5 L


 


Hct    35.3 L


 


MCV    95.1


 


MCH    33.7


 


MCHC    35.4


 


RDW    12.1


 


Plt Count    203


 


MPV    8.9


 


Neut % (Auto)    58.2


 


Lymph % (Auto)    28.0


 


Mono % (Auto)    8.5


 


Eos % (Auto)    4.8


 


Baso % (Auto)    0.4


 


Nucleat RBC Rel Count    0.0


 


Absolute Neuts (auto)    3.90


 


Absolute Lymphs (auto)    1.88


 


Absolute Monos (auto)    0.57


 


Absolute Eos (auto)    0.32


 


Absolute Basos (auto)    0.03


 


Absolute Nucleated RBC    0.00


 


Immature Gran %    0.1


 


Immature Gran #    0.01


 


PT  15.0  


 


INR  1.16  


 


APTT  32.6  


 


Sodium   138 


 


Potassium   3.8 


 


Chloride   101 


 


Carbon Dioxide   28 


 


Anion Gap   9 


 


BUN   19 


 


Creatinine   0.7 


 


Estimated GFR   > 60 


 


Glucose   100 


 


Calcium   9.0 


 


Total Bilirubin   0.8 


 


Conjugated Bilirubin   0.4 


 


Unconjugated Bilirubin   0.4 


 


AST   40 


 


ALT   23 


 


Alkaline Phosphatase   84 


 


Troponin I   < 0.012 


 


Total Protein   6.6 


 


Albumin   3.8 


 


Lipase   224 


 


Urine Color   


 


Urine Appearance   


 


Urine pH   


 


Ur Specific Gravity   


 


Urine Protein   


 


Urine Ketones   


 


Urine Blood   


 


Urine Nitrate   


 


Urine Bilirubin   


 


Urine Urobilinogen   


 


Ur Leukocyte Esterase   


 


Urine RBC   


 


Urine WBC   


 


Ur Epithelial Cells   


 


Urine Mucus   


 


Urine Glucose   














  03/26/18





  21:55


 


WBC 


 


RBC 


 


Hgb 


 


Hct 


 


MCV 


 


MCH 


 


MCHC 


 


RDW 


 


Plt Count 


 


MPV 


 


Neut % (Auto) 


 


Lymph % (Auto) 


 


Mono % (Auto) 


 


Eos % (Auto) 


 


Baso % (Auto) 


 


Nucleat RBC Rel Count 


 


Absolute Neuts (auto) 


 


Absolute Lymphs (auto) 


 


Absolute Monos (auto) 


 


Absolute Eos (auto) 


 


Absolute Basos (auto) 


 


Absolute Nucleated RBC 


 


Immature Gran % 


 


Immature Gran # 


 


PT 


 


INR 


 


APTT 


 


Sodium 


 


Potassium 


 


Chloride 


 


Carbon Dioxide 


 


Anion Gap 


 


BUN 


 


Creatinine 


 


Estimated GFR 


 


Glucose 


 


Calcium 


 


Total Bilirubin 


 


Conjugated Bilirubin 


 


Unconjugated Bilirubin 


 


AST 


 


ALT 


 


Alkaline Phosphatase 


 


Troponin I 


 


Total Protein 


 


Albumin 


 


Lipase 


 


Urine Color  YELLOW


 


Urine Appearance  CLEAR


 


Urine pH  5.0


 


Ur Specific Gravity  1.012


 


Urine Protein  NEGATIVE


 


Urine Ketones  TRACE H


 


Urine Blood  NEGATIVE


 


Urine Nitrate  NEGATIVE


 


Urine Bilirubin  NEGATIVE


 


Urine Urobilinogen  NEGATIVE


 


Ur Leukocyte Esterase  NEGATIVE


 


Urine RBC  NONE SEEN


 


Urine WBC  1-3


 


Ur Epithelial Cells  TRACE


 


Urine Mucus  TRACE


 


Urine Glucose  NEGATIVE











Imaging Review: 





CT Brain (Without Contrast) at 2052 hours 


 


 History: Dizziness and headache this evening. 


 


 Comparison: September 22, 2013 


 


 Technique:  Axial computed tomographic images of the brain without contrast. 

Dose reduction 


techniques were utilized. 


 


 Findings:  Ventricles, cisterns, and sulci are normal without atrophy, 

hydrocephalus, midline shift


/herniation, or epidural/subdural hematomas. Partial cavum septum lucidum again 

noted. No acute 


intraparenchymal hemorrhage, definite infarct, or mass effect. Bone windows 

demonstrate no 


displaced fractures. Paranasal sinuses and mastoid air cells are clear. 


 


 


Impression: 


1. Normal CT brain without contrast. 


2. Consider MRI of the brain, if there is continued clinical concern. 


 


Findings were discussed by telephone with Alex Mirza at 2106 hours 3/26/2018 


Visualized and Interpreted imaging results: Yes


Visualized and Interpreted EKG results: Yes


EKG additional interpertation: NSR in the 60s.  No acute ST changes.  Slightly 

prominent T-waves and lateral leads but not peaked.  QTC is 416.





Assessment & Plan


Assessment: 








Rafi is a 68-year-old pleasant gentleman with history of Parkinson's disease 

and recent hospitalization for upper GI bleeding due to gastric ulcer who 

presents to the emergency department today with complaints of severe headache 

and lightheadedness.  He denies any weakness or focal deficits.





Headache (Acute) - possibly related to dehydration or pain less likely C acute 

CVA.  CT head was negative for any acute findings.  Left lower facial droop was 

noted during the interview.  Patient reports that this is a chronic finding and 

remains unchanged.  He denies any dysphagia or drooling.





Lightheaded (Acute) - this has resolved following some hydration.  Will 

continue to monitor.  PT OT will be consulted.





Left lower quadrant abdominal pain - patient with significant tenderness in the 

left lower quadrant.  No rebound but some voluntary guarding on initial exam.  

He is currently afebrile but concerned that he may have potentially developed 

diverticulitis which could be exacerbating his Parkinson's symptoms.





Anemia due to history of GI bleed no longer acute.  Will monitor patient's H&H.

  He denies any symptoms consistent with active GI bleeding.  He has been will 

plan to continue patient's PPI.





Gastric ulcer - continue PPI as above.





Chronic medical problems


Parkinson's disease - continue patient's carbidopa/levodopa as per his 

schedule.  I have ordered a.m. And continue daily dosing 1 med rec is available 

for continuation.


Benign essential hypertension- blood pressures at this time are acceptable.  

Patient is not currently on any chronic therapy/hyper antihypertensives


HLD - reaction to statins.


PHILLIP - supplemental oxygen at HS


Constipation - bowel regimen following CT of abdomen pelvis.





FEN - some gentle IV fluid hydration overnight make patient NPO for study.  

Electrolyte monitoring and replacement p.r.n..


Cor status-full


PPX-SCDs only at this time given patient's recent history of upper GI bleeding 

will not give any anticoagulation.


Disposition patient has been admitted to observation status on the medical 

floor pending reassessment of his on headache and lightheadedness symptoms and 

evaluation of the left lower quadrant abdominal pain.

## 2018-05-23 ENCOUNTER — HOSPITAL ENCOUNTER (OUTPATIENT)
Dept: HOSPITAL 80 - FIMAGING | Age: 68
End: 2018-05-23
Attending: PHYSICIAN ASSISTANT
Payer: COMMERCIAL

## 2018-05-23 DIAGNOSIS — M19.012: ICD-10-CM

## 2018-05-23 DIAGNOSIS — M41.86: ICD-10-CM

## 2018-05-23 DIAGNOSIS — M19.011: ICD-10-CM

## 2018-05-23 DIAGNOSIS — M51.36: Primary | ICD-10-CM

## 2018-05-23 PROCEDURE — A9503 TC99M MEDRONATE: HCPCS

## 2019-03-29 ENCOUNTER — HOSPITAL ENCOUNTER (EMERGENCY)
Dept: HOSPITAL 80 - FED | Age: 69
Discharge: HOME | End: 2019-03-29
Payer: COMMERCIAL

## 2019-03-29 VITALS — DIASTOLIC BLOOD PRESSURE: 101 MMHG | SYSTOLIC BLOOD PRESSURE: 177 MMHG

## 2019-03-29 DIAGNOSIS — I10: ICD-10-CM

## 2019-03-29 DIAGNOSIS — R06.02: Primary | ICD-10-CM

## 2019-03-29 DIAGNOSIS — E78.5: ICD-10-CM

## 2019-03-29 DIAGNOSIS — G20: ICD-10-CM

## 2019-03-29 DIAGNOSIS — Z86.711: ICD-10-CM

## 2019-03-29 LAB — PLATELET # BLD: 221 10^3/UL (ref 150–400)

## 2019-03-29 NOTE — CPEKG
Test Reason : OPEN

Blood Pressure : ***/*** mmHG

Vent. Rate : 058 BPM     Atrial Rate : 058 BPM

   P-R Int : 155 ms          QRS Dur : 094 ms

    QT Int : 405 ms       P-R-T Axes : 079 071 072 degrees

   QTc Int : 398 ms

 

Sinus rhythm

 

Confirmed by Davon Barnes ([id number]) on 3/29/2019 7:21:44 PM

 

Referred By: Edna Garcia           Confirmed By:Davon Barnes

## 2019-03-29 NOTE — EDPHY
H & P


Stated Complaint: cp,sob


Time Seen by Provider: 03/29/19 17:48


HPI/ROS: 





CHIEF COMPLAINT:  Shortness of breath x1 week.  Slight heaviness in the chest.





HISTORY OF PRESENT ILLNESS:  This is a 69-year-old gentleman with history of 

Parkinson's disease scheduled to have a deep brain stimulator placed next week 

who presents reporting that for the last week he has been short of breath with 

vague chest discomfort.  Symptoms have been present consistently for last week.

  He feels more short of breath when he is walking.  No fevers or chills.  No 

cold symptoms, no cough, no nausea or vomiting, no diaphoresis, urinary 

complaints, no headache, no lightheadedness.


Patient was seen by a hematologist oncologist 3 days ago and reports that he 

had elevated D-dimer.


Patient has no history of hypertension or hypercholesterolemia.  No diabetes.  

Nonsmoker.  No family history of early coronary artery disease.





REVIEW OF SYSTEMS: 


A comprehensive 10 system review of systems was reviewed and is otherwise 

negative aside from elements mentioned in the history of present illness and 

medical decision making.





PAST MEDICAL HISTORY:  Parkinson disease.  History of PEs.  History of peptic 

ulcer disease.





SOCIAL HISTORY:  Nonsmoker.  .





VITAL SIGNS Reviewed by me.


GENERAL:  Well-developed, well-nourished, persistent tremors of head.


HEENT:  Atraumatic.  Eyes:  No icterus, no injection. Mouth:  moist mucous 

membranes.  No erythema or lesions. Neck:  supple with no adenopathy.


LUNGS:  Clear to auscultation bilaterally, no wheezes, rhonchi or rales.


CARDIAC:  Regular rate and rhythm, no rubs, murmurs or gallops.


ABDOMEN:  Soft, nontender, nondistended, bowel sounds normal.


BACK:  No CVA tenderness.


EXTREMITIES:  No trauma. No edema.  Range of motion is normal throughout.


NEURO:  Alert and oriented,  grossly nonfocal.


SKIN:  Warm and dry, no rash.


PSYCHIATRIC:  Normal mentation, no agitation.





- Personal History


Current Tetanus Diphtheria and Acellular Pertussis (TDAP): Yes


Tetanus Vaccine Date: >10 YRS





- Medical/Surgical History


Hx Asthma: No


Hx Chronic Respiratory Disease: No


Hx Diabetes: No


Hx Cardiac Disease: No


Hx Renal Disease: No


Hx Cirrhosis: No


Hx Alcoholism: No


Hx HIV/AIDS: No


Hx Splenectomy or Spleen Trauma: No


Other PMH: parkinsons.  chronic bk issues.  PE.  Peptic ulcer disease





- Social History


Smoking Status: Never smoked


Constitutional: 


 Initial Vital Signs











Temperature (C)  36.4 C   03/29/19 17:45


 


Heart Rate  63   03/29/19 17:45


 


Respiratory Rate  20   03/29/19 17:45


 


O2 Sat (%)  97   03/29/19 17:45








 











O2 Delivery Mode               Room Air














Allergies/Adverse Reactions: 


 





phenol Allergy (Severe, Verified 03/29/19 17:42)


 Other-Enter Comments


Statins-Hmg-Coa Reductase Inhibitor Allergy (Severe, Verified 03/29/19 17:42)


 Other-Enter Comments


iodine [Iodine] Allergy (Intermediate, Verified 03/29/19 17:42)


 EAT TOO MUCH FISH, RASH (PIMPLES ON BACK)


CHOCOLATE Allergy (Intermediate, Uncoded 03/29/19 17:42)


 PIMPLES ON BACK 2 DAYS AFTER EATING








Home Medications: 














 Medication  Instructions  Recorded


 


Aspirin EC [Aspirin EC 81 mg (*)] 81 mg PO DAILY@1200 03/23/18


 


Baclofen [Baclofen 20 mg (*)] 20 mg PO 08,12,21 03/23/18


 


Carbidopa/Levodopa 25/100Mg 1 tab PO TID PRN 03/23/18





[Sinemet 25/100 MG (*)]  


 


Carbidopa/Levodopa [Rytary ER 1 - 2 each PO HS PRN 03/23/18





48.75 mg-195 mg Cap]  


 


Carbidopa/Levodopa [Rytary ER 2 each PO DAILY@20 03/23/18





48.75 mg-195 mg Cap]  


 


Carbidopa/Levodopa [Rytary ER 3 each PO 0630,1100,1530 03/23/18





48.75 mg-195 mg Cap]  


 


Cholecalciferol Vit D3 [Vitamin D3 5,000 units PO DAILY 03/23/18





(*)]  


 


Dicyclomine [Bentyl 10 MG (*)] 10 mg PO QID PRN 03/23/18


 


Gabapentin [Neurontin 300 MG (*)] 300 - 600 mg PO HS 03/23/18


 


Gabapentin [Neurontin 300 MG (*)] 300 mg PO 08,12 03/23/18


 


Herbals/Supplements -Info Only 1 ea PO DAILY 03/23/18


 


Lidocaine [Lidocare] 1 each TP DAILY 03/23/18


 


Linaclotide [Linzess] 290 mcg PO DAILY 03/23/18


 


Meloxicam 15 mg PO DAILY 03/23/18


 


Niacin [Niacin 500 mg (*)] 1,000 mg PO DAILY@1200 03/23/18


 


Niacin [Niacin 500 mg (*)] 500 mg PO 08,18 03/23/18


 


Omega-3 Fatty Acids [Fish Oil 1000 1,000 mg PO 08,18 03/23/18





mg (*)]  


 


Omega-3 Fatty Acids [Fish Oil 1000 2,000 mg PO DAILY@1200 03/23/18





mg (*)]  


 


Polyethylene Glycol 3350 [Miralax 8.5 - 34 gm PO DAILY PRN 03/23/18





17 gm (*)]  


 


Propylene Glycol/Peg 400 [Systane 1 - 2 drops OP Q2 PRN 03/23/18





Ultra 0.4-0.3% Eye Drp]  


 


Rotigotine [Neupro] 1 patch TP DAILY@2200 03/23/18


 


clonazePAM [Klonopin (*)] 0.25 mg PO 08,21 03/23/18


 


Acetaminophen [Tylenol 325mg (*)] 650 mg PO Q4HRS PRN  tab 03/27/18


 


Omeprazole 40 mg PO BID #60 capsule. 03/27/18


 


Sennosides/Docusate Sodium 1 tab PO BID #60 tab 03/27/18





[Senokot-S]  


 


Dexamethasone [Decadron 2 MG (*)] 2 mg PO BID #14 tab 07/18/18














Medical Decision Making





- Diagnostics


EKG Interpretation: 





12-LEAD EKG:  Please see the full report in [facility].  My interpretation:  

Sinus rhythm no ST or T-wave changes.





Imaging Results: 


 Imaging Impressions





Chest/Thorax CTA  03/29/19 18:16


Impression:     Negative CT examination of the chest for acute pulmonary 

thromboembolic disease.


 


Results called to Dr. Edna Garcia at 7:05 PM at the time of the interpretation.











ED Course/Re-evaluation: 





67-year-old gentleman with history of PEs, no longer anticoagulated, presents 

with chest discomfort and shortness of breath which is been present for about a 

week.  He is scheduled to have a deep brain stimulator placed on Monday and is 

concerned that he may have recurrent pulmonary embolism.





EKG shows no ischemic changes.


Bedside troponin is negative.


D-dimer on prior evaluation is elevated at 0.9.


Patient underwent CT scan of the chest to evaluate for pulmonary embolism.  

This was negative for any PE.





No other significant findings in the chest to explain the patient's shortness 

of breath.





Patient and his wife were comfortable being discharged home.  I believe the 

patient is low risk for acute coronary syndrome, has a nonischemic EKG and 

normal troponin despite several days of ongoing shortness of breath and chest 

discomfort.  He will discuss his shortness of breath with his surgeon tomorrow 

in light of the fact that he is scheduled for surgery on Monday.  He has no 

infectious symptomatology.  He return to the emergency department or seek care 

urgently if his symptoms are worsening.


Differential Diagnosis: 





Differential diagnosis for the patient's shortness of breath was considered 

including but not limited to pulmonary infectious processes, COPD exacerbation,

  pulmonary emboli, pulmonary edema, congestive heart failure, and cardiac 

causes.





- Data Points


Laboratory Results: 


 Laboratory Results





 03/29/19 18:00 





 03/29/19 18:00 





 











  03/29/19 03/29/19 03/29/19





  18:05 18:00 18:00


 


WBC      





    


 


RBC      





    


 


Hgb      





    


 


Hct      





    


 


MCV      





    


 


MCH      





    


 


MCHC      





    


 


RDW      





    


 


Plt Count      





    


 


MPV      





    


 


Neut % (Auto)      





    


 


Lymph % (Auto)      





    


 


Mono % (Auto)      





    


 


Eos % (Auto)      





    


 


Baso % (Auto)      





    


 


Nucleat RBC Rel Count      





    


 


Absolute Neuts (auto)      





    


 


Absolute Lymphs (auto)      





    


 


Absolute Monos (auto)      





    


 


Absolute Eos (auto)      





    


 


Absolute Basos (auto)      





    


 


Absolute Nucleated RBC      





    


 


Immature Gran %      





    


 


Immature Gran #      





    


 


D-Dimer    0.77 ug/mLFEU H ug/mLFEU  





    (0.00-0.50)  


 


Sodium      137 mEq/L mEq/L





     (135-145) 


 


Potassium      4.3 mEq/L mEq/L





     (3.5-5.2) 


 


Chloride      99 mEq/L mEq/L





     () 


 


Carbon Dioxide      30 mEq/l mEq/l





     (22-31) 


 


Anion Gap      8 mEq/L mEq/L





     (6-14) 


 


BUN      18 mg/dL mg/dL





     (7-23) 


 


Creatinine      0.7 mg/dL mg/dL





     (0.7-1.3) 


 


Estimated GFR      > 60 





    


 


Glucose      81 mg/dL mg/dL





     () 


 


Calcium      9.5 mg/dL mg/dL





     (8.5-10.4) 


 


POC Troponin I  0.00 ng/mL ng/mL    





   (0.00-0.08)   














  03/29/19





  18:00


 


WBC  5.86 10^3/uL 10^3/uL





   (3.80-9.50) 


 


RBC  5.01 10^6/uL 10^6/uL





   (4.40-6.38) 


 


Hgb  15.9 g/dL g/dL





   (13.7-17.5) 


 


Hct  46.6 % %





   (40.0-51.0) 


 


MCV  93.0 fL fL





   (81.5-99.8) 


 


MCH  31.7 pg pg





   (27.9-34.1) 


 


MCHC  34.1 g/dL g/dL





   (32.4-36.7) 


 


RDW  12.3 % %





   (11.5-15.2) 


 


Plt Count  221 10^3/uL 10^3/uL





   (150-400) 


 


MPV  9.0 fL fL





   (8.7-11.7) 


 


Neut % (Auto)  57.3 % %





   (39.3-74.2) 


 


Lymph % (Auto)  27.8 % %





   (15.0-45.0) 


 


Mono % (Auto)  10.2 % %





   (4.5-13.0) 


 


Eos % (Auto)  3.8 % %





   (0.6-7.6) 


 


Baso % (Auto)  0.7 % %





   (0.3-1.7) 


 


Nucleat RBC Rel Count  0.0 % %





   (0.0-0.2) 


 


Absolute Neuts (auto)  3.36 10^3/uL 10^3/uL





   (1.70-6.50) 


 


Absolute Lymphs (auto)  1.63 10^3/uL 10^3/uL





   (1.00-3.00) 


 


Absolute Monos (auto)  0.60 10^3/uL 10^3/uL





   (0.30-0.80) 


 


Absolute Eos (auto)  0.22 10^3/uL 10^3/uL





   (0.03-0.40) 


 


Absolute Basos (auto)  0.04 10^3/uL 10^3/uL





   (0.02-0.10) 


 


Absolute Nucleated RBC  0.00 10^3/uL 10^3/uL





   (0-0.01) 


 


Immature Gran %  0.2 % %





   (0.0-1.1) 


 


Immature Gran #  0.01 10^3/uL 10^3/uL





   (0.00-0.10) 


 


D-Dimer  





  


 


Sodium  





  


 


Potassium  





  


 


Chloride  





  


 


Carbon Dioxide  





  


 


Anion Gap  





  


 


BUN  





  


 


Creatinine  





  


 


Estimated GFR  





  


 


Glucose  





  


 


Calcium  





  


 


POC Troponin I  





  











Medications Given: 


 








Discontinued Medications





Sodium Chloride (Ns)  1,000 mls @ 0 mls/hr IV ONCE ONE; Wide Open


   PRN Reason: Protocol


   Stop: 03/29/19 18:21


   Last Admin: 03/29/19 18:41 Dose:  1,000 mls


Methylprednisolone Sodium Succinate (Solu-Medrol)  125 mg IVP EDNOW ONE


   Stop: 03/29/19 18:21


   Last Admin: 03/29/19 18:42 Dose:  Not Given





Point of Care Test Results: 


 Chemistry











  03/29/19





  18:05


 


POC Troponin I  0.00 ng/mL ng/mL





   (0.00-0.08) 














Departure





- Departure


Disposition: Home, Routine, Self-Care


Clinical Impression: 


 Shortness of breath





Condition: Good


Instructions:  Dyspnea (ED)


Additional Instructions: 


There is no clear evidence of cardiac cause of the shortness of breath.


You have no blood clot in your lungs.





Your EKG demonstrates normal sinus rhythm with no signs of ischemia.


Your troponin was negative.





CT scan of your chest demonstrates no pneumonia and no blood clot.





Please contact your surgeon to discuss these findings prior to your scheduled 

surgery.





If you develop a fever, cough, worsening shortness of breath, palpitations, 

lightheadedness, dizziness, or fainting, please return to the emergency 

department or seek care urgently











Referrals: 


Uri Valdez MD [Primary Care Provider] - As per Instructions

## 2019-04-24 ENCOUNTER — HOSPITAL ENCOUNTER (OUTPATIENT)
Age: 69
End: 2019-04-24
Payer: COMMERCIAL

## 2019-04-24 DIAGNOSIS — S06.5X9A: Primary | ICD-10-CM

## 2019-04-24 DIAGNOSIS — G20: ICD-10-CM
